# Patient Record
(demographics unavailable — no encounter records)

---

## 2025-01-16 NOTE — PHYSICAL EXAM
[Alert] : alert [Healthy Appearance] : healthy appearance [No Acute Distress] : no acute distress [No Proptosis] : no proptosis [No Lid Lag] : no lid lag [Thyroid Not Enlarged] : the thyroid was not enlarged [No Respiratory Distress] : no respiratory distress [No Accessory Muscle Use] : no accessory muscle use [Clear to Auscultation] : lungs were clear to auscultation bilaterally [Normal S1, S2] : normal S1 and S2 [No Edema] : no peripheral edema [No Stigmata of Cushings Syndrome] : no stigmata of Cushings Syndrome [Oriented x3] : oriented to person, place, and time [No Tremors] : no tremors

## 2025-01-16 NOTE — ASSESSMENT
[FreeTextEntry1] : 78 years old lady who present for evaluation  # hypothyroidism - diagnosed at the age of 42 , started on Synthroid - tried levothyroxine generic for a while but has been on Synthroid ( brand ) for a while , with a dose change frequently - most recently on Synthroid 112 mcg dose lowered 2/2024 from 125 mcg - report occasional palpitations and anxiety  - 1/2025: TSH ok on synthroid 100 mcg daily   # preDM 1/2024: Ac1 5.8% - 7/2024: A1c 6%  - 1/2025: A1c 5.7%  discussed diet and limiting carbs

## 2025-01-16 NOTE — HISTORY OF PRESENT ILLNESS
[FreeTextEntry1] : 78 years old lady who present for evaluation   # hypothyroidism  - diagnosed at the age of 42 , started on Synthroid  - tried levothyroxine generic for a while but has been on Synthroid ( brand ) for a while , with a dose change frequently  - most recently on Synthroid 112 mcg dose lowered 2/2024 from 125 mcg  - report occasional palpitations and anxiety  - 7/2024: TSH low , changed Synthroid to 100 mcg daily   # preDM  1/2024: Ac1 5.8%  - 7/2024: A1c 6%  - 1/2025: A!c 5.7%

## 2025-01-16 NOTE — DATA REVIEWED
[FreeTextEntry1] : 1/2024: TSH 0.05 3/2024: TSH 0.08 T4 9.7 t3 110  glucose 120  GFR 58 calcium 9.5 LDL 85 Tg 172   1/2025: A1c 5.7% TSH and ft4 ok lipids

## 2025-01-29 NOTE — REASON FOR VISIT
[Follow-Up - Clinic] : a clinic follow-up of [Coronary Artery Disease] : coronary artery disease [Hyperlipidemia] : hyperlipidemia [Hypertension] : hypertension [FreeTextEntry3] : Star

## 2025-01-29 NOTE — REVIEW OF SYSTEMS
[Cough] : cough [Joint Pain] : joint pain [Anxiety] : anxiety [Negative] : Genitourinary [FreeTextEntry7] : loose stools . Has hiatal hernia

## 2025-01-29 NOTE — PHYSICAL EXAM
[General Appearance - Well Developed] : well developed [Normal Appearance] : normal appearance [Well Groomed] : well groomed [General Appearance - Well Nourished] : well nourished [No Deformities] : no deformities [General Appearance - In No Acute Distress] : no acute distress [Normal Conjunctiva] : the conjunctiva exhibited no abnormalities [Eyelids - No Xanthelasma] : the eyelids demonstrated no xanthelasmas [Normal Oral Mucosa] : normal oral mucosa [No Oral Pallor] : no oral pallor [No Oral Cyanosis] : no oral cyanosis [Abnormal Walk] : normal gait [Gait - Sufficient For Exercise Testing] : the gait was sufficient for exercise testing [Nail Clubbing] : no clubbing of the fingernails [Cyanosis, Localized] : no localized cyanosis [Petechial Hemorrhages (___cm)] : no petechial hemorrhages [Skin Color & Pigmentation] : normal skin color and pigmentation [] : no rash [No Venous Stasis] : no venous stasis [Skin Lesions] : no skin lesions [No Skin Ulcers] : no skin ulcer [No Xanthoma] : no  xanthoma was observed [Oriented To Time, Place, And Person] : oriented to person, place, and time [Affect] : the affect was normal [Mood] : the mood was normal [No Anxiety] : not feeling anxious [FreeTextEntry1] : No JVD

## 2025-01-29 NOTE — ASSESSMENT
[FreeTextEntry1] : 79 y.o. female with PMH of pAfib (on Eliquis seen by EP and decided against Watchman device), CAD, HTN, HLD  -She had LHC in 09/2023 which showed mild ISR of BMS in pLAD and 70% lesion in dLAD which is being treated medically.  -Patient also has history of anxiety, PCP didn't start SSRIs because of possible interaction with DOAC.   The patient had known patent LAD BMS and distal disease in the LAD which is 70-%. She has not had chest pain. LDL is acceptable and BP is stable. She did not want to have watchman done and she is tolerating Eliquis without issues at this time. She has remained in NSR. The patient had her thyroid adjusted by Dr. Sanchez. The patient has remained unchanged since her last visit .Has mld AS   Plan: Continue current medication Follow up in 6 months  Echocardiogram for pulmonary pressure s

## 2025-01-29 NOTE — HISTORY OF PRESENT ILLNESS
[FreeTextEntry1] : 79 y.o. female with PMH of pAfib (on Eliquis seen by EP and decided against Watchman device), CAD, HTN, HLD  -She had LHC in 09/2023 which showed mild ISR of BMS in pLAD and 70% lesion in dLAD which is being treated medically.  -Patient also has history of anxiety, PCP didn't start SSRIs because of possible interaction with DOAC.   Patient presents for follow up visit. No chest pain, shortness of breath, palpitations, dizziness, syncope, or lower extremity edema. Overall feeling well.   1/10/25   (not fasting) HDL 65 LDL 85  TSH 0.64  Free T4 1.4 GFR 51 Cr 1.1 A1c 5.7%

## 2025-07-21 NOTE — ASSESSMENT
[FreeTextEntry1] : 79 years old lady who present for follow up evaluation  # hypothyroidism - diagnosed at the age of 42 , started on Synthroid - tried levothyroxine generic for a while but has been on Synthroid ( brand ) for a while , with a dose change frequently - most recently on Synthroid 112 mcg dose lowered 2/2024 from 125 mcg - report occasional palpitations and anxiety  - 1/2025: TSH ok on synthroid 100 mcg daily  - 7/2025: tfts normal on Synthroid 100 mcg daily  can be monitored yearly earlier if any signs of hyper/ hypothyroidism   # preDM 1/2024: Ac1 5.8% - 7/2024: A1c 6%  - 1/2025: A1c 5.7%  - 7/2025: A1c 5.6% off meds discussed diet and limiting carbs   LDL mild increased she will follow with Dr espitia

## 2025-07-21 NOTE — DATA REVIEWED
[FreeTextEntry1] : 1/2024: TSH 0.05 3/2024: TSH 0.08 T4 9.7 t3 110  glucose 120  GFR 58 calcium 9.5 LDL 85 Tg 172   1/2025: A1c 5.7% TSH and ft4 ok lipids  7/2025: TSH 0.72 ft4 1.4 A1c 5.6% glucose 103 crea 1  GFR 57 calcium 9.4  Tg 219

## 2025-07-21 NOTE — PHYSICAL EXAM
[Alert] : alert [Healthy Appearance] : healthy appearance [No Acute Distress] : no acute distress [No Proptosis] : no proptosis [No Lid Lag] : no lid lag [Thyroid Not Enlarged] : the thyroid was not enlarged [No Respiratory Distress] : no respiratory distress [No Accessory Muscle Use] : no accessory muscle use [Clear to Auscultation] : lungs were clear to auscultation bilaterally [Normal S1, S2] : normal S1 and S2 [No Edema] : no peripheral edema [No Stigmata of Cushings Syndrome] : no stigmata of Cushings Syndrome [No Tremors] : no tremors [Oriented x3] : oriented to person, place, and time

## 2025-07-21 NOTE — HISTORY OF PRESENT ILLNESS
[FreeTextEntry1] : 79 years old lady who present for follow up evaluation   # hypothyroidism  - diagnosed at the age of 42 , started on Synthroid  - tried levothyroxine generic for a while but has been on Synthroid ( brand ) for a while , with a dose change frequently  - most recently on Synthroid 112 mcg dose lowered 2/2024 from 125 mcg  - report occasional palpitations and anxiety  - 7/2024: TSH low , changed Synthroid to 100 mcg daily  - 7/2025: TSH normal and Ft4 ok on synthroid 100 mcg daily   # preDM  1/2024: Ac1 5.8%  - 7/2024: A1c 6%  - 1/2025: A!c 5.7%  - 7/2025: A1c 5.6%

## 2025-07-30 NOTE — CARDIOLOGY SUMMARY
[___] : [unfilled] [de-identified] : 6- NSR NS T wave change.  1- SB NS T wave change.  5-  NSR Normal ECG . 02/02/2024 - SR 02/02/2024 SR, Twave abnormality in V3, similar to previous ECG 9- NSR NS  Tave cjage  8- NSR NS T wave change  10- Sinus Bradycardia NS T wave change   7- SB PVC  1- SB 57bpm,  7- Sinus Bradycardia NS  T wave change  [de-identified] : 7- NELLI No AF  [de-identified] :  Normal LV systolic function trace MR and TR . Mild dilitation of the LA  2-4-2022 Normal LV systolic function mild MR mild TR mild enlargemnt of LA  8-2023 Normal LV systolic function mild MR mild AS mold TR RVSP was 41 mmhg  4/30/25 TTE 63% trace MR/ trace TR mild AS. RVSP was 33 mmhg  [de-identified] : 5- patent BMS in LAD , 70% distal LAD

## 2025-07-30 NOTE — HISTORY OF PRESENT ILLNESS
[FreeTextEntry1] : 79 y.o. female with PMH of pAfib (on Eliquis seen by EP and decided against Watchman device), CAD, HTN, HLD  -She had LHC in 09/2023 which showed mild ISR of BMS in pLAD and 70% lesion in dLAD which is being treated medically.  -Patient also has history of anxiety, PCP didn't start SSRIs because of possible interaction with DOAC.   Patient presents for follow up visit. No chest pain, shortness of breath, palpitations, dizziness, syncope, or lower extremity edema. Overall feeling well. not formally exercising but active frequent stair climbing.  The patent did have an episode of palpitations after taking her Synthroid in the middle of the night . She felt her heart racing for 20 minutes before resolving on her own

## 2025-07-30 NOTE — ASSESSMENT
[FreeTextEntry1] : 79 y.o. female with PMH of pAfib (on Eliquis seen by EP and decided against Watchman device), CAD, HTN, HLD  -She had C in 09/2023 which showed mild ISR of BMS in pLAD and 70% lesion in dLAD which is being treated medically.  -Patient also has history of anxiety, PCP didn't start SSRIs because of possible interaction with DOAC.   The patient had known patent LAD BMS and distal disease in the LAD which is 70-%. She has not had chest pain. LDL is acceptable and BP is stable. She did not want to have watchman done and she is tolerating Eliquis without issues at this time. She has remained in NSR. The patient had her thyroid adjusted by Dr. Sanchez. The patient has remained unchanged since her last visit .Has mld AS . The patient has palpitations of uncertain etiology  She has had AF in the past and this was brief . She is on eliquis for stroke prevention .   CAD s/p PCI with ISR of BMS med therapy per [facility] 2023 PAF  HTN HLD  - 7/13/25   HDL 59    Plan: LDL not at goal given ISR of mLAD disease  rec increase statin therapy to crestor to 5 mg qd . The patient has been intolerant to higher does Rosuvastatin  c/w zetia 10mg po daily  Continue current medication c/w BB/ACE / Imdur for medical management CAD If palpitations are recurrent will consider then MCOT   Continue Eliquis